# Patient Record
Sex: MALE | Employment: FULL TIME | URBAN - METROPOLITAN AREA
[De-identification: names, ages, dates, MRNs, and addresses within clinical notes are randomized per-mention and may not be internally consistent; named-entity substitution may affect disease eponyms.]

---

## 2017-04-24 DIAGNOSIS — Z30.2 ENCOUNTER FOR VASECTOMY: Primary | ICD-10-CM

## 2017-04-24 LAB — SPERM P VAS SMN QL MICRO: NORMAL

## 2017-04-24 PROCEDURE — 89321 SEMEN ANAL SPERM DETECTION: CPT | Performed by: UROLOGY

## 2019-04-25 ENCOUNTER — OFFICE VISIT (OUTPATIENT)
Dept: PODIATRY | Facility: CLINIC | Age: 34
End: 2019-04-25
Payer: COMMERCIAL

## 2019-04-25 ENCOUNTER — ANCILLARY PROCEDURE (OUTPATIENT)
Dept: GENERAL RADIOLOGY | Facility: CLINIC | Age: 34
End: 2019-04-25
Attending: PODIATRIST
Payer: COMMERCIAL

## 2019-04-25 VITALS — HEIGHT: 72 IN | WEIGHT: 178 LBS | BODY MASS INDEX: 24.11 KG/M2

## 2019-04-25 DIAGNOSIS — M72.2 PLANTAR FASCIAL FIBROMATOSIS: Primary | ICD-10-CM

## 2019-04-25 DIAGNOSIS — M21.6X2 EQUINUS DEFORMITY OF BOTH FEET: ICD-10-CM

## 2019-04-25 DIAGNOSIS — M21.6X1 EQUINUS DEFORMITY OF BOTH FEET: ICD-10-CM

## 2019-04-25 DIAGNOSIS — Q66.71 PES CAVUS OF RIGHT FOOT: ICD-10-CM

## 2019-04-25 DIAGNOSIS — M72.2 PLANTAR FASCIAL FIBROMATOSIS: ICD-10-CM

## 2019-04-25 PROCEDURE — 99204 OFFICE O/P NEW MOD 45 MIN: CPT | Performed by: PODIATRIST

## 2019-04-25 PROCEDURE — 73630 X-RAY EXAM OF FOOT: CPT | Mod: TC

## 2019-04-25 RX ORDER — DICLOFENAC SODIUM 75 MG/1
75 TABLET, DELAYED RELEASE ORAL 2 TIMES DAILY
Qty: 60 TABLET | Refills: 1 | Status: SHIPPED | OUTPATIENT
Start: 2019-04-25 | End: 2019-12-11

## 2019-04-25 ASSESSMENT — MIFFLIN-ST. JEOR: SCORE: 1780.4

## 2019-04-25 ASSESSMENT — PAIN SCALES - GENERAL: PAINLEVEL: EXTREME PAIN (8)

## 2019-04-25 NOTE — NURSING NOTE
Dispensed 1 Dorsal (Anterior) Night Splint, Size S/M, with [facility] agreement signed by patient. Lis Ralph CMA, April 25, 2019

## 2019-04-25 NOTE — PATIENT INSTRUCTIONS
Reliable shoe stores: To maximize your experience and provide the best possible fit.  Be sure to show them your foot concerns and tell them Dr. Winter sent you.      Stores listed in bold have only athletic shoes, and stores that are not bold are mostly casual or variety of shoes    [employer]  [address] [place] [address]  [phone]    [employer] [place]  [address] [place] [address]  [phone]     [employer]  [address] [place] [address]  [phone]    [employer]  [address] [facility] [address]  [phone]    [employer]  [address] [place], [place] [address] [phone]    [employer]  [address] [place] [address]  [phone]                         [employer] Locations:     [address] [place] [address]   [phone]     [address] [place] [address] [address] [place] [address]   [phone]     [address] [place] [address]   [phone]     [address] [facility] [place] [address]   [phone]     [address] [facility] [place] [address]   [phone]     [address] [place] [address]   [phone]     [address] [facility] [address]   [place] [address]   [phone]     [address] [facility]. [address]   [place] [address]   [phone]             How to find reasonable shoes          The correct width    Correct Fitting    Correct Length      Foot Distortion    Posture Distortion                          Torsional Rigidity      Grasp behind the heel and underneath the foot and twist      Bad    Excessive torsion/twist in midfoot     Less torsion/twist in midfoot is better                   Heel Counter Rigidity      Grasp just above   midsole and squeeze      Bad    Soft heel counter      Good    Rigid Heel Counter      Flexion Rigidity      Grasp shoe and bend from forefoot to rearfoot              PLANTAR FASCIITIS  The  plantar fascia  is a  thick fibrous layer of tissue that covers the bones on the bottom of your foot. It supports the foot bones in an arched position.  Plantar fasciitis  is a painful inflammation of the plantar fascia due to overuse. This can develop gradually or suddenly. It usually affects one foot at a time but can affect both feet. Heel pain can be sharp and feel like a knife sticking in the bottom of your foot. Pain may occur after exercising, long distance jogging, stair climbing, long periods of standing, or after getting up from a seated position.  Risk factors include arthritis, diabetes, obesity or recent weight gain, flat-foot, high arch, wearing high heels or loose shoes or shoes with poor arch support.  Sudden changes in activity or shoe gear may contribute to symptoms.  Foot pain from this condition is usually worse in the morning and improves with walking. By the end of the day there may be a dull aching. Treatment requires improved support of feet, short-term rest and controlling inflammation. It may take up to nine months before all symptoms go away with the measures described below.  A steroid injection into the foot, or surgery may be needed if this is becomes long standing or severe.  HOME CARE  1. If you are overweight, lose weight to promote healing.  2. Choose supportive shoes (stiff through the shank) with good arch support and shock absorbency. Replace athletic shoes when they become worn out. Don t walk or run barefoot.  3. Shoe inserts are an important part of treatment. You can buy off-the-shelf shoe inserts inexpensively such as [employer].  The best ones are custom molded to your foot with a prescription.  4. Night splints keep the plantar fascia gently stretched while you sleep and will eliminate morning pain. Wear it ALL NIGHT EVERY NIGHT, or any time you sit for a long time.  5. Reduce by 10% or more the activities that stress the feet: jogging, prolonged standing or walking, high impact sports, etc.  6.  Stretch your feet. Gently flex your ankle by leaning into a wall or counter or drop your heel from a step.  Stretch two minutes of every hour you are awake.  7. Icing or massage may help heel pain. Apply an ice pack or frozen water or Coke bottle to the heel for 10-20 minutes as a preventive or after an acute flare of symptoms. You may repeat this as needed.   Follow up with your Doctor in 3 weeks as instructed.

## 2019-04-25 NOTE — PROGRESS NOTES
HPI:  Saad Alfaro is a 33 year old male who is seen in consultation at the request of self.    Pt presents for eval of:   (Onset, Location, L/R, Character, Treatments, Injury if yes)    XR Left and Right foot today, 2019    Presents today with supportive athletic shoes and orthotics.     1. Onset 4/15/2019, dorsal Right foot pain. No injury noted.   Intermittent, sharp, stabbing, burning, swelling, pain 8 with certain ROM   Intermittent use of NS    2. 2018 - B/L plantar fasciitis - Ongoing plantar Left and Right heel and arch pain.  Constant, throbbing.    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      ROS: 10 point ROS neg other than the symptoms noted above in the HPI.    There is no problem list on file for this patient.      PAST MEDICAL HISTORY: History reviewed. No pertinent past medical history.  PAST SURGICAL HISTORY: History reviewed. No pertinent surgical history.  MEDICATIONS:   Current Outpatient Medications:      diclofenac (VOLTAREN) 75 MG EC tablet, Take 1 tablet (75 mg) by mouth 2 times daily, Disp: 60 tablet, Rfl: 1     raNITIdine HCl (ZANTAC 75 PO), , Disp: , Rfl:   ALLERGIES:  No Known Allergies  SOCIAL HISTORY:   Social History     Socioeconomic History     Marital status:      Spouse name: Not on file     Number of children: Not on file     Years of education: Not on file     Highest education level: Not on file   Occupational History     Not on file   Social Needs     Financial resource strain: Not on file     Food insecurity:     Worry: Not on file     Inability: Not on file     Transportation needs:     Medical: Not on file     Non-medical: Not on file   Tobacco Use     Smoking status: Former Smoker     Last attempt to quit: 1/3/2008     Years since quittin.3     Smokeless tobacco: Never Used   Substance and Sexual Activity     Alcohol use: Yes     Drug use: No     Sexual activity: Yes      Partners: Female     Birth control/protection: Condom   Lifestyle     Physical activity:     Days per week: Not on file     Minutes per session: Not on file     Stress: Not on file   Relationships     Social connections:     Talks on phone: Not on file     Gets together: Not on file     Attends Sikh service: Not on file     Active member of club or organization: Not on file     Attends meetings of clubs or organizations: Not on file     Relationship status: Not on file     Intimate partner violence:     Fear of current or ex partner: Not on file     Emotionally abused: Not on file     Physically abused: Not on file     Forced sexual activity: Not on file   Other Topics Concern     Parent/sibling w/ CABG, MI or angioplasty before 65F 55M? Not Asked   Social History Narrative     Not on file     FAMILY HISTORY:   Family History   Problem Relation Age of Onset     Other Cancer Father        EXAM:Vitals: BP (P) 118/80 (BP Location: Left arm, Cuff Size: Adult Regular)   Ht 1.829 m (6')   Wt 80.7 kg (178 lb)   BMI 24.14 kg/m    BMI= Body mass index is 24.14 kg/m .    General appearance: Patient is alert and fully cooperative with history & exam.  No sign of distress is noted during the visit.     Psychiatric: Affect is pleasant & appropriate.  Patient appears motivated to improve health.     Respiratory: Breathing is regular & unlabored while sitting.     HEENT: Hearing is intact to spoken word.  Speech is clear.  No gross evidence of visual impairment that would impact ambulation.     Vascular: DP & PT 2/4 & regular bilaterally.  No significant edema, rubor or varicosities noted.  CFT and skin temperature is normal to both lower extremities.       Neurologic: Lower extremity sensation is intact to light touch.  No evidence of weakness in the lower extremities.  No evidence of neuropathy and negative tinel sign.     Dermatologic: Skin is intact to both lower extremities without significant lesions, rash or  abrasion.  Normal texture turgor and tone. No paronychia or evidence of soft tissue infection is noted.    Musculoskeletal: Patient is ambulatory without assistive device or brace. Pain is noted with firm palpation along the medial band of the plantar fascia bilateral foot most notably at the origination upon the calcaneus not through the arch.  No pain with compression of the calcaneus medial to lateral or with palpation of the achilles, peroneal or posterior tibial tendons.  Slightly more than 0  of ankle joint dorsiflexion without crepitus or pain throughout the ankle, subtalar or midtarsal joints.  No pain or limitations throughout manual muscle strength testing plus 5/5 to all four quadrants bilateral.  No palpable edema noted.      Overall a high arched cavus foot type is noted.  Subtly inverted calcaneus upon weightbearing with genu varum as well.  About 0 degrees of ankle joint dorsiflexion noted.  No crepitus through range of motion of the ankle subtalar midtarsal metatarsal phalangeal joint.    Radiographs:  Elevated calcaneal inclination angle consistent with pes cavus.     ASSESSMENT:       ICD-10-CM    1. Plantar fascial fibromatosis M72.2 XR Foot Bilateral G/E 3 Views     ORTHOTICS REFERRAL     diclofenac (VOLTAREN) 75 MG EC tablet   2. Pes cavus of right foot Q66.7    3. Equinus deformity of both feet M21.6X1     M21.6X2        PLAN:  Reviewed patient[facility] chart in [facility] and discussed etiology and treatment options.      Treatments:  4/25/2019  Discontinue barefoot walking or unsupported walking in shoes without shank.  Dispensed written instructions to obtain appropriate shoe gear and/or OTC inserts.  Dispensed anterior night splint to use all night every night.  Prescription oral Voltaren for a short course. Discussed risks.  Prescription for custom molded orthotics 4/25/2019  Follow up in 4-5 weeks     Written instructions regarding appropriate stretching.  All questions were answered follow-up as  needed.      REDD TellezM

## 2019-04-25 NOTE — LETTER
4/25/2019         RE: Saad Alfaro  [address]   [facility] [address]        Dear Colleague,    Thank you for referring your patient, Saad Alfaro, to the [facility]. Please see a copy of my visit note below.    HPI:  Saad Alfaro is a 33 year old male who is seen in consultation at the request of self.    Pt presents for eval of:   (Onset, Location, L/R, Character, Treatments, Injury if yes)    XR Left and Right foot today, 4/25/2019    Presents today with supportive athletic shoes and orthotics.     1. Onset 4/15/2019, dorsal Right foot pain. No injury noted.   Intermittent, sharp, stabbing, burning, swelling, pain 8 with certain ROM   Intermittent use of NS    2. 5/2018 - B/L plantar fasciitis - Ongoing plantar Left and Right heel and arch pain.  Constant, throbbing.    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      ROS: 10 point ROS neg other than the symptoms noted above in the HPI.    There is no problem list on file for this patient.      PAST MEDICAL HISTORY: History reviewed. No pertinent past medical history.  PAST SURGICAL HISTORY: History reviewed. No pertinent surgical history.  MEDICATIONS:   Current Outpatient Medications:      diclofenac (VOLTAREN) 75 MG EC tablet, Take 1 tablet (75 mg) by mouth 2 times daily, Disp: 60 tablet, Rfl: 1     raNITIdine HCl (ZANTAC 75 PO), , Disp: , Rfl:   ALLERGIES:  No Known Allergies  SOCIAL HISTORY:   Social History     Socioeconomic History     Marital status:      Spouse name: Not on file     Number of children: Not on file     Years of education: Not on file     Highest education level: Not on file   Occupational History     Not on file   Social Needs     Financial resource strain: Not on file     Food insecurity:     Worry: Not on file     Inability: Not on file     Transportation needs:     Medical: Not on file     Non-medical: Not on file    Tobacco Use     Smoking status: Former Smoker     Last attempt to quit: 1/3/2008     Years since quittin.3     Smokeless tobacco: Never Used   Substance and Sexual Activity     Alcohol use: Yes     Drug use: No     Sexual activity: Yes     Partners: Female     Birth control/protection: Condom   Lifestyle     Physical activity:     Days per week: Not on file     Minutes per session: Not on file     Stress: Not on file   Relationships     Social connections:     Talks on phone: Not on file     Gets together: Not on file     Attends Mandaeism service: Not on file     Active member of club or organization: Not on file     Attends meetings of clubs or organizations: Not on file     Relationship status: Not on file     Intimate partner violence:     Fear of current or ex partner: Not on file     Emotionally abused: Not on file     Physically abused: Not on file     Forced sexual activity: Not on file   Other Topics Concern     Parent/sibling w/ CABG, MI or angioplasty before 65F 55M? Not Asked   Social History Narrative     Not on file     FAMILY HISTORY:   Family History   Problem Relation Age of Onset     Other Cancer Father        EXAM:Vitals: BP (P) 118/80 (BP Location: Left arm, Cuff Size: Adult Regular)   Ht 1.829 m (6')   Wt 80.7 kg (178 lb)   BMI 24.14 kg/m     BMI= Body mass index is 24.14 kg/m .    General appearance: Patient is alert and fully cooperative with history & exam.  No sign of distress is noted during the visit.     Psychiatric: Affect is pleasant & appropriate.  Patient appears motivated to improve health.     Respiratory: Breathing is regular & unlabored while sitting.     HEENT: Hearing is intact to spoken word.  Speech is clear.  No gross evidence of visual impairment that would impact ambulation.     Vascular: DP & PT 2/4 & regular bilaterally.  No significant edema, rubor or varicosities noted.  CFT and skin temperature is normal to both lower extremities.       Neurologic: Lower  extremity sensation is intact to light touch.  No evidence of weakness in the lower extremities.  No evidence of neuropathy and negative tinel sign.     Dermatologic: Skin is intact to both lower extremities without significant lesions, rash or abrasion.  Normal texture turgor and tone. No paronychia or evidence of soft tissue infection is noted.    Musculoskeletal: Patient is ambulatory without assistive device or brace. Pain is noted with firm palpation along the medial band of the plantar fascia bilateral foot most notably at the origination upon the calcaneus not through the arch.  No pain with compression of the calcaneus medial to lateral or with palpation of the achilles, peroneal or posterior tibial tendons.  Slightly more than 0  of ankle joint dorsiflexion without crepitus or pain throughout the ankle, subtalar or midtarsal joints.  No pain or limitations throughout manual muscle strength testing plus 5/5 to all four quadrants bilateral.  No palpable edema noted.      Overall a high arched cavus foot type is noted.  Subtly inverted calcaneus upon weightbearing with genu varum as well.  About 0 degrees of ankle joint dorsiflexion noted.  No crepitus through range of motion of the ankle subtalar midtarsal metatarsal phalangeal joint.    Radiographs:  Elevated calcaneal inclination angle consistent with pes cavus.     ASSESSMENT:       ICD-10-CM    1. Plantar fascial fibromatosis M72.2 XR Foot Bilateral G/E 3 Views     ORTHOTICS REFERRAL     diclofenac (VOLTAREN) 75 MG EC tablet   2. Pes cavus of right foot Q66.7    3. Equinus deformity of both feet M21.6X1     M21.6X2        PLAN:  Reviewed patient's chart in [facility] and discussed etiology and treatment options.      Treatments:  4/25/2019  Discontinue barefoot walking or unsupported walking in shoes without shank.  Dispensed written instructions to obtain appropriate shoe gear and/or OTC inserts.  Dispensed anterior night splint to use all night every  night.  Prescription oral Voltaren for a short course. Discussed risks.  Prescription for custom molded orthotics 4/25/2019  Follow up in 4-5 weeks     Written instructions regarding appropriate stretching.  All questions were answered follow-up as needed.      Sergio Winter DPM          Again, thank you for allowing me to participate in the care of your patient.        Sincerely,        Sergio Winter DPM

## 2019-12-11 ENCOUNTER — OFFICE VISIT (OUTPATIENT)
Dept: FAMILY MEDICINE | Facility: OTHER | Age: 34
End: 2019-12-11
Payer: COMMERCIAL

## 2019-12-11 VITALS
SYSTOLIC BLOOD PRESSURE: 130 MMHG | OXYGEN SATURATION: 98 % | WEIGHT: 182 LBS | RESPIRATION RATE: 16 BRPM | BODY MASS INDEX: 24.68 KG/M2 | TEMPERATURE: 97.9 F | HEART RATE: 71 BPM | DIASTOLIC BLOOD PRESSURE: 86 MMHG

## 2019-12-11 DIAGNOSIS — R22.2 NODULE OF SKIN OF BACK: Primary | ICD-10-CM

## 2019-12-11 PROCEDURE — 99202 OFFICE O/P NEW SF 15 MIN: CPT | Performed by: NURSE PRACTITIONER

## 2019-12-11 ASSESSMENT — PAIN SCALES - GENERAL: PAINLEVEL: MILD PAIN (2)

## 2019-12-11 ASSESSMENT — ENCOUNTER SYMPTOMS
CONSTITUTIONAL NEGATIVE: 1
RESPIRATORY NEGATIVE: 1
HEMATOLOGIC/LYMPHATIC NEGATIVE: 1
CARDIOVASCULAR NEGATIVE: 1
ENDOCRINE NEGATIVE: 1

## 2019-12-11 NOTE — PROGRESS NOTES
Subjective     Saad Alfaro is a 33 year old male who presents to clinic today for the following health issues:    HPI   Cyst on Back      Duration:   years    Description (location/character/radiation):   Size of dime or nickel - under the skin, it has been a little more irritated lately        Intensity:  mild    Accompanying signs and symptoms: tender - slight    History (similar episodes/previous evaluation): None    Precipitating or alleviating factors: None    Therapies tried and outcome: None     Non-tender  More noticeable in the last year.     No current outpatient medications on file.     BP Readings from Last 3 Encounters:   12/20/19 (!) 157/90   12/11/19 130/86   04/25/19 (P) 118/80    Wt Readings from Last 3 Encounters:   12/20/19 81.6 kg (180 lb)   12/11/19 82.6 kg (182 lb)   04/25/19 80.7 kg (178 lb)                    Reviewed and updated as needed this visit by Provider         Review of Systems   Constitutional: Negative.    Respiratory: Negative.    Cardiovascular: Negative.    Endocrine: Negative.    Hematological: Negative.             Objective    /86   Pulse 71   Temp 97.9  F (36.6  C)   Resp 16   Wt 82.6 kg (182 lb)   SpO2 98%   BMI 24.68 kg/m    Body mass index is 24.68 kg/m .  Physical Exam  Skin:     General: Skin is warm.             Comments: Midline upper back- small dime sized nodule, no redness or signs of infection, non-tender, non-mobile.             Diagnostic Test Results:  none         Assessment & Plan       ICD-10-CM    1. Nodule of skin of back R22.2      Patient here today with concerns of a nodule on his back.  This is consistent with a cyst.  He notes that this is been present for quite some time.  He has noted that it has been more irritated lately.  Does not note any tenderness on exam.  I did advise him that it is uncertain if this will grow or get infected in the future.  I did have 1 of my PAs Dudley Montez consult on this lesion.  He is willing to see  patient in clinic to have this removed.  Patient was unsure if he was going to have this removed yet.  He will call back to reschedule.  Advised if any worsening symptoms fevers or chills to return to clinic sooner.    The patient indicates understanding of these issues and agrees with the plan.    There are no Patient Instructions on file for this visit.    No follow-ups on file.    ESTHELA Hernadez [facility]

## 2019-12-18 ENCOUNTER — TELEPHONE (OUTPATIENT)
Dept: FAMILY MEDICINE | Facility: OTHER | Age: 34
End: 2019-12-18

## 2019-12-18 NOTE — TELEPHONE ENCOUNTER
Rescheduled for JM for 200. Left message for patient to call. Please confirm this is okay.  Sandy Masters, CMA

## 2019-12-18 NOTE — TELEPHONE ENCOUNTER
Please call patient, he is on my schedule for a cyst removal on Friday. However, this will be done by JM. The consult was by me. Please put on JM schedule. He needs 45 minutes. If no 45 minutes available then recommend another day.       ESTHELA Hernadez CNP  Questions or concerns please feel free to send me a [employer] message or call me  Phone : [phone]

## 2019-12-18 NOTE — PROGRESS NOTES
Subjective     Saad Alfaro is a 34 year old male who presents to clinic today for the following health issues:    HPI     Patient in clinic to have cyst removed over middle back. He had a consult with Hayley Page. He wants it removed to prevent future infection or growth of the cyst in the future.      Reviewed and updated as needed this visit by Provider  Allergies  Meds  Problems    Review of Systems   GENERAL: Denies fever, fatigue, weakness, weight gain, or weight loss.  SKIN: +Nontender nodule over mid back.        Objective    BP (!) 157/90   Pulse 81   Temp 97.5  F (36.4  C) (Temporal)   Resp 16   Ht 1.829 m (6')   Wt 81.6 kg (180 lb)   SpO2 97%   BMI 24.41 kg/m    Body mass index is 24.41 kg/m .  Physical Exam   GENERAL: healthy, alert and no distress  SKIN: Subcutaneous nodule with central pore over central mid back measuring ~1.2 x 1.2 cm.    Assessment & Plan       ICD-10-CM    1. Sebaceous cyst L72.3 Puncture (I&D)Drainage of Lesion/Cyst  [72427]        After informed written consent was obtained, using alcohol and Betadine swabs for cleansing and 1% Lidocaine with epinephrine for anesthetic, with sterile technique a 1 cm vertical incision was made using a 15 blade. There was almost immediate purulent discharge noted and a moderate amount of purulent material was expressed using firm pressure. The cyst was was visualized and found to be very thin and easily removed. Hemostasis was obtained by pressure and wound was sutured with 2, 4-0 Ethilon sutures. Antibiotic dressing is applied, and wound care instructions provided. He was instructed to be alert for any signs of cutaneous infection. The procedure was well tolerated without complications.    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 I recommend he try a few throws and if he has any wound irritation or bleeding, he should not bowl any more.  Follow up  in 10 days for suture removal.       Brandon Montez PA-C  [facility]

## 2019-12-20 ENCOUNTER — OFFICE VISIT (OUTPATIENT)
Dept: FAMILY MEDICINE | Facility: OTHER | Age: 34
End: 2019-12-20
Payer: COMMERCIAL

## 2019-12-20 VITALS
TEMPERATURE: 97.5 F | SYSTOLIC BLOOD PRESSURE: 157 MMHG | DIASTOLIC BLOOD PRESSURE: 90 MMHG | BODY MASS INDEX: 24.38 KG/M2 | WEIGHT: 180 LBS | OXYGEN SATURATION: 97 % | HEART RATE: 81 BPM | RESPIRATION RATE: 16 BRPM | HEIGHT: 72 IN

## 2019-12-20 DIAGNOSIS — L72.3 SEBACEOUS CYST: Primary | ICD-10-CM

## 2019-12-20 PROCEDURE — 99207 ZZC DROP WITH A PROCEDURE: CPT | Mod: 25 | Performed by: PHYSICIAN ASSISTANT

## 2019-12-20 PROCEDURE — 10060 I&D ABSCESS SIMPLE/SINGLE: CPT | Performed by: PHYSICIAN ASSISTANT

## 2019-12-20 ASSESSMENT — MIFFLIN-ST. JEOR: SCORE: 1784.47

## 2019-12-20 NOTE — PATIENT INSTRUCTIONS
Keep the bandage on tonight. Can remove in the morning.  If you start to notice any bleeding or irritation after a few bowling throws, avoid any further bowling.  Wash the area daily with a warm, soapy cloth but avoid direct water pressure or submersion until sutures are removed.  If you notice any signs of infection, contact clinic.

## 2022-01-25 ENCOUNTER — TELEPHONE (OUTPATIENT)
Dept: FAMILY MEDICINE | Facility: OTHER | Age: 37
End: 2022-01-25
Payer: COMMERCIAL

## 2022-01-25 NOTE — TELEPHONE ENCOUNTER
Patient is traveling out of the country 02/07 and needs a letter stating he had covid in November and he has recovered in order to return to the country.  Please call with questions.  Would like to  when completed.

## 2022-01-26 NOTE — TELEPHONE ENCOUNTER
Message left for patient to call clinic back.    When patient calls back, please relay providers message.   He can schedule an appointment with a provider if he is not able to get a note from where he tested positive from  Patty Oconnell MA on 1/26/2022 at 10:23 AM

## 2022-12-01 NOTE — TELEPHONE ENCOUNTER
I haven't seen this patient in years. And it is unlikely he is still positive for travel in Feb  An Rutherford MD     No

## 2023-03-31 ENCOUNTER — OFFICE VISIT (OUTPATIENT)
Dept: FAMILY MEDICINE | Facility: OTHER | Age: 38
End: 2023-03-31
Payer: COMMERCIAL

## 2023-03-31 VITALS
OXYGEN SATURATION: 99 % | WEIGHT: 175 LBS | BODY MASS INDEX: 23.7 KG/M2 | HEART RATE: 70 BPM | DIASTOLIC BLOOD PRESSURE: 86 MMHG | HEIGHT: 72 IN | RESPIRATION RATE: 16 BRPM | TEMPERATURE: 97.4 F | SYSTOLIC BLOOD PRESSURE: 110 MMHG

## 2023-03-31 DIAGNOSIS — Z00.00 ROUTINE HISTORY AND PHYSICAL EXAMINATION OF ADULT: Primary | ICD-10-CM

## 2023-03-31 DIAGNOSIS — F10.11 HISTORY OF ETOH ABUSE: ICD-10-CM

## 2023-03-31 DIAGNOSIS — Z11.59 NEED FOR HEPATITIS C SCREENING TEST: ICD-10-CM

## 2023-03-31 DIAGNOSIS — E78.5 HYPERLIPIDEMIA LDL GOAL <130: ICD-10-CM

## 2023-03-31 DIAGNOSIS — K21.00 GASTROESOPHAGEAL REFLUX DISEASE WITH ESOPHAGITIS, UNSPECIFIED WHETHER HEMORRHAGE: ICD-10-CM

## 2023-03-31 DIAGNOSIS — Z11.4 SCREENING FOR HIV (HUMAN IMMUNODEFICIENCY VIRUS): ICD-10-CM

## 2023-03-31 DIAGNOSIS — Z72.89 ENGAGES IN VAPING: ICD-10-CM

## 2023-03-31 LAB
ALBUMIN SERPL BCG-MCNC: 4.3 G/DL (ref 3.5–5.2)
ALP SERPL-CCNC: 71 U/L (ref 40–129)
ALT SERPL W P-5'-P-CCNC: 29 U/L (ref 10–50)
ANION GAP SERPL CALCULATED.3IONS-SCNC: 7 MMOL/L (ref 7–15)
AST SERPL W P-5'-P-CCNC: 20 U/L (ref 10–50)
BILIRUB SERPL-MCNC: 0.3 MG/DL
BUN SERPL-MCNC: 12.8 MG/DL (ref 6–20)
CALCIUM SERPL-MCNC: 9.4 MG/DL (ref 8.6–10)
CHLORIDE SERPL-SCNC: 103 MMOL/L (ref 98–107)
CHOLEST SERPL-MCNC: 161 MG/DL
CREAT SERPL-MCNC: 1.03 MG/DL (ref 0.67–1.17)
DEPRECATED HCO3 PLAS-SCNC: 30 MMOL/L (ref 22–29)
ERYTHROCYTE [DISTWIDTH] IN BLOOD BY AUTOMATED COUNT: 13.8 % (ref 10–15)
GFR SERPL CREATININE-BSD FRML MDRD: >90 ML/MIN/1.73M2
GLUCOSE SERPL-MCNC: 82 MG/DL (ref 70–99)
HCT VFR BLD AUTO: 44.5 % (ref 40–53)
HDLC SERPL-MCNC: 51 MG/DL
HGB BLD-MCNC: 14.5 G/DL (ref 13.3–17.7)
LDLC SERPL CALC-MCNC: 94 MG/DL
MCH RBC QN AUTO: 27.9 PG (ref 26.5–33)
MCHC RBC AUTO-ENTMCNC: 32.6 G/DL (ref 31.5–36.5)
MCV RBC AUTO: 86 FL (ref 78–100)
NONHDLC SERPL-MCNC: 110 MG/DL
PLATELET # BLD AUTO: 251 10E3/UL (ref 150–450)
POTASSIUM SERPL-SCNC: 3.9 MMOL/L (ref 3.4–5.3)
PROT SERPL-MCNC: 7.8 G/DL (ref 6.4–8.3)
RBC # BLD AUTO: 5.19 10E6/UL (ref 4.4–5.9)
SODIUM SERPL-SCNC: 140 MMOL/L (ref 136–145)
TRIGL SERPL-MCNC: 82 MG/DL
WBC # BLD AUTO: 8.3 10E3/UL (ref 4–11)

## 2023-03-31 PROCEDURE — 80053 COMPREHEN METABOLIC PANEL: CPT | Performed by: PHYSICIAN ASSISTANT

## 2023-03-31 PROCEDURE — 99385 PREV VISIT NEW AGE 18-39: CPT | Mod: 25 | Performed by: PHYSICIAN ASSISTANT

## 2023-03-31 PROCEDURE — 99213 OFFICE O/P EST LOW 20 MIN: CPT | Mod: 25 | Performed by: PHYSICIAN ASSISTANT

## 2023-03-31 PROCEDURE — 90471 IMMUNIZATION ADMIN: CPT | Performed by: PHYSICIAN ASSISTANT

## 2023-03-31 PROCEDURE — 90715 TDAP VACCINE 7 YRS/> IM: CPT | Performed by: PHYSICIAN ASSISTANT

## 2023-03-31 PROCEDURE — 36415 COLL VENOUS BLD VENIPUNCTURE: CPT | Performed by: PHYSICIAN ASSISTANT

## 2023-03-31 PROCEDURE — 85027 COMPLETE CBC AUTOMATED: CPT | Performed by: PHYSICIAN ASSISTANT

## 2023-03-31 PROCEDURE — 80061 LIPID PANEL: CPT | Performed by: PHYSICIAN ASSISTANT

## 2023-03-31 RX ORDER — OMEPRAZOLE 40 MG/1
40 CAPSULE, DELAYED RELEASE ORAL DAILY
Qty: 90 CAPSULE | Refills: 1 | Status: SHIPPED | OUTPATIENT
Start: 2023-03-31

## 2023-03-31 ASSESSMENT — ENCOUNTER SYMPTOMS
NAUSEA: 0
DYSURIA: 0
DIARRHEA: 0
HEARTBURN: 1
SHORTNESS OF BREATH: 0
DIZZINESS: 0
WEAKNESS: 0
ARTHRALGIAS: 0
MYALGIAS: 0
FEVER: 0
FREQUENCY: 0
COUGH: 0
NERVOUS/ANXIOUS: 0
JOINT SWELLING: 0
EYE PAIN: 0
PALPITATIONS: 0
HEADACHES: 0
CONSTIPATION: 0
CHILLS: 0
HEMATOCHEZIA: 0
ABDOMINAL PAIN: 0
SORE THROAT: 0
PARESTHESIAS: 0
HEMATURIA: 0

## 2023-03-31 ASSESSMENT — PAIN SCALES - GENERAL: PAINLEVEL: NO PAIN (0)

## 2023-03-31 NOTE — PROGRESS NOTES
SUBJECTIVE:   CC: Chidi is an 37 year old who presents for preventative health visit.   Additional Questions 3/31/2023   Roomed by michael   Accompanied by no one     Patient Reported Additional Medications 3/31/2023   Patient reports taking the following new medications no one   Patient has been advised of split billing requirements and indicates understanding: Yes  Healthy Habits:     Getting at least 3 servings of Calcium per day:  Yes    Bi-annual eye exam:  Yes    Dental care twice a year:  NO    Sleep apnea or symptoms of sleep apnea:  None    Diet:  Regular (no restrictions)    Frequency of exercise:  2-3 days/week    Duration of exercise:  30-45 minutes    Taking medications regularly:  Yes    Medication side effects:  None    PHQ-2 Total Score: 0    Additional concerns today:  No      Today's PHQ-2 Score:   PHQ-2 ( 1999 [employer]) 3/31/2023   Q1: Little interest or pleasure in doing things 0   Q2: Feeling down, depressed or hopeless 0   PHQ-2 Score 0   Q1: Little interest or pleasure in doing things Not at all   Q2: Feeling down, depressed or hopeless Not at all   PHQ-2 Score 0       Have you ever done Advance Care Planning? (For example, a Health Directive, POLST, or a discussion with a medical provider or your loved ones about your wishes): No, advance care planning information given to patient to review.  Patient plans to discuss their wishes with loved ones or provider.      Social History     Tobacco Use     Smoking status: Former     Types: Cigarettes     Quit date: 1/3/2008     Years since quitting: 15.2     Smokeless tobacco: Never   Substance Use Topics     Alcohol use: Yes         Alcohol Use 3/31/2023   Prescreen: >3 drinks/day or >7 drinks/week? No       Last PSA: No results found for: PSA    Reviewed orders with patient. Reviewed health maintenance and updated orders accordingly - Yes  Lab work is in process  Labs reviewed in EPIC  BP Readings from Last 3 Encounters:   03/31/23 110/86   12/20/19  "(!) 157/90   12/11/19 130/86    Wt Readings from Last 3 Encounters:   03/31/23 79.4 kg (175 lb)   12/20/19 81.6 kg (180 lb)   12/11/19 82.6 kg (182 lb)                  Patient Active Problem List   Diagnosis     Hyperlipidemia LDL goal <130     History of ETOH abuse     Engages in vaping - Occasional THC     No past surgical history on file.    Social History     Tobacco Use     Smoking status: Former     Types: Cigarettes     Quit date: 1/3/2008     Years since quitting: 15.2     Smokeless tobacco: Never   Substance Use Topics     Alcohol use: Yes     Family History   Problem Relation Age of Onset     Other Cancer Father          No current outpatient medications on file.     No Known Allergies  No lab results found.     Reviewed and updated as needed this visit by clinical staff   Tobacco  Allergies  Meds              Reviewed and updated as needed this visit by Provider                 No past medical history on file.   No past surgical history on file.    Review of Systems   Constitutional: Negative for chills and fever.   HENT: Negative for congestion, ear pain, hearing loss and sore throat.    Eyes: Negative for pain and visual disturbance.   Respiratory: Negative for cough and shortness of breath.    Cardiovascular: Negative for chest pain, palpitations and peripheral edema.   Gastrointestinal: Positive for heartburn. Negative for abdominal pain, constipation, diarrhea, hematochezia and nausea.   Genitourinary: Negative for dysuria, frequency, genital sores, hematuria, impotence, penile discharge and urgency.   Musculoskeletal: Negative for arthralgias, joint swelling and myalgias.   Skin: Negative for rash.   Neurological: Negative for dizziness, weakness, headaches and paresthesias.   Psychiatric/Behavioral: Negative for mood changes. The patient is not nervous/anxious.      OBJECTIVE:   /86   Pulse 70   Temp 97.4  F (36.3  C) (Temporal)   Resp 16   Ht 1.821 m (5' 11.69\")   Wt 79.4 kg (175 " lb)   SpO2 99%   BMI 23.94 kg/m      Physical Exam  GENERAL: healthy, alert and no distress  EYES: Eyes grossly normal to inspection, PERRL and conjunctivae and sclerae normal  HENT: ear canals and TM's normal, nose and mouth without ulcers or lesions  NECK: no adenopathy, no asymmetry, masses, or scars and thyroid normal to palpation  RESP: lungs clear to auscultation - no rales, rhonchi or wheezes  CV: regular rate and rhythm, normal S1 S2, no S3 or S4, no murmur, click or rub, no peripheral edema and peripheral pulses strong  ABDOMEN: soft, nontender, no hepatosplenomegaly, no masses and bowel sounds normal  MS: no gross musculoskeletal defects noted, no edema  SKIN: no suspicious lesions or rashes  NEURO: Normal strength and tone, mentation intact and speech normal  PSYCH: mentation appears normal, affect normal/bright    Diagnostic Test Results:  Labs reviewed in Epic  No results found for any visits on 03/31/23.    ASSESSMENT/PLAN:   Saad was seen today for physical.    Diagnoses and all orders for this visit:    Routine history and physical examination of adult  -     CBC with platelets; Future  -     Comprehensive metabolic panel (BMP + Alb, Alk Phos, ALT, AST, Total. Bili, TP); Future  -     Lipid panel reflex to direct LDL Non-fasting; Future    Hyperlipidemia LDL goal <130  -     Comprehensive metabolic panel (BMP + Alb, Alk Phos, ALT, AST, Total. Bili, TP); Future  -     Lipid panel reflex to direct LDL Non-fasting; Future    History of ETOH abuse    Engages in vaping - Occasional THC    Gastroesophageal reflux disease with esophagitis, unspecified whether hemorrhage  -     omeprazole (PRILOSEC) 40 MG DR capsule; Take 1 capsule (40 mg) by mouth daily Take 30-60 minutes before a meal.    Screening for HIV (human immunodeficiency virus)    Need for hepatitis C screening test    Other orders  -     REVIEW OF HEALTH MAINTENANCE PROTOCOL ORDERS  -     TDAP VACCINE (Adacel, Boostrix)    37-year-old  male here for routine physical advise repeat in 1 year.    LDL goal discussed.  Advise dietary changes should these be elevated but with his history of alcohol use and vaping we need to take a careful look at this as well.    Does not drink much for alcohol anymore.  Does do fairly regular vaping with occasional THC.  Advised against this.    Mild heartburn symptoms are noted.  Recommended trial of medications follow-up as needed.    Declined screening for HIV and hepatitis C considering himself low risk.    Patient has been advised of split billing requirements and indicates understanding: Yes      COUNSELING:   Reviewed preventive health counseling, as reflected in patient instructions       Regular exercise       Healthy diet/nutrition       Vision screening       Hearing screening       Consider Hep C screening for all patients one time for ages 18-79 years       HIV screeninx in teen years, 1x in adult years, and at intervals if high risk       Colorectal cancer screening       Prostate cancer screening        He reports that he quit smoking about 15 years ago. His smoking use included cigarettes. He has never used smokeless tobacco.      Danish Lugo PA-C  [facility]

## 2024-03-01 ENCOUNTER — PATIENT OUTREACH (OUTPATIENT)
Dept: CARE COORDINATION | Facility: CLINIC | Age: 39
End: 2024-03-01
Payer: COMMERCIAL

## 2024-03-15 ENCOUNTER — PATIENT OUTREACH (OUTPATIENT)
Dept: CARE COORDINATION | Facility: CLINIC | Age: 39
End: 2024-03-15
Payer: COMMERCIAL

## 2024-05-04 ENCOUNTER — HEALTH MAINTENANCE LETTER (OUTPATIENT)
Age: 39
End: 2024-05-04

## 2025-05-17 ENCOUNTER — HEALTH MAINTENANCE LETTER (OUTPATIENT)
Age: 40
End: 2025-05-17